# Patient Record
Sex: FEMALE | Race: WHITE | URBAN - NONMETROPOLITAN AREA
[De-identification: names, ages, dates, MRNs, and addresses within clinical notes are randomized per-mention and may not be internally consistent; named-entity substitution may affect disease eponyms.]

---

## 2021-10-11 ENCOUNTER — OFFICE VISIT (OUTPATIENT)
Dept: URBAN - NONMETROPOLITAN AREA CLINIC 4 | Facility: CLINIC | Age: 56
End: 2021-10-11
Payer: COMMERCIAL

## 2021-10-11 ENCOUNTER — LAB OUTSIDE AN ENCOUNTER (OUTPATIENT)
Dept: URBAN - NONMETROPOLITAN AREA CLINIC 4 | Facility: CLINIC | Age: 56
End: 2021-10-11

## 2021-10-11 ENCOUNTER — WEB ENCOUNTER (OUTPATIENT)
Dept: URBAN - NONMETROPOLITAN AREA CLINIC 4 | Facility: CLINIC | Age: 56
End: 2021-10-11

## 2021-10-11 VITALS
WEIGHT: 293 LBS | TEMPERATURE: 97.3 F | HEART RATE: 49 BPM | SYSTOLIC BLOOD PRESSURE: 135 MMHG | BODY MASS INDEX: 50.02 KG/M2 | DIASTOLIC BLOOD PRESSURE: 77 MMHG | HEIGHT: 64 IN

## 2021-10-11 DIAGNOSIS — E66.01 CLASS 3 SEVERE OBESITY DUE TO EXCESS CALORIES WITHOUT SERIOUS COMORBIDITY IN ADULT, UNSPECIFIED BMI: ICD-10-CM

## 2021-10-11 DIAGNOSIS — R74.8 ELEVATED LIVER ENZYMES: ICD-10-CM

## 2021-10-11 PROCEDURE — 99244 OFF/OP CNSLTJ NEW/EST MOD 40: CPT | Performed by: INTERNAL MEDICINE

## 2021-10-11 PROCEDURE — G8417 CALC BMI ABV UP PARAM F/U: HCPCS | Performed by: INTERNAL MEDICINE

## 2021-10-11 PROCEDURE — G8427 DOCREV CUR MEDS BY ELIG CLIN: HCPCS | Performed by: INTERNAL MEDICINE

## 2021-10-11 PROCEDURE — G9903 PT SCRN TBCO ID AS NON USER: HCPCS | Performed by: INTERNAL MEDICINE

## 2021-10-11 RX ORDER — URSODIOL 500 MG/1
1 TABLET TABLET ORAL TWICE A DAY
Qty: 180 TABLET | Refills: 1 | OUTPATIENT
Start: 2021-10-11

## 2021-10-11 RX ORDER — ASPIRIN 81 MG
1 TABLET TABLET, DELAYED RELEASE (ENTERIC COATED) ORAL ONCE A DAY
Status: ACTIVE | COMMUNITY

## 2021-10-11 NOTE — HPI-TODAY'S VISIT:
10/11/2021 Patient is a 55 year old White female who presents on referral from Vish Stevenson DNP, NP-C for evaluation of hepatomegaly and elevated LFTs. A copy of the note will be sent to the referring provider. Labs on 7/24/2021 showed elevated liver enzymes with AST 67, ALT 66. Viral hepatitis panel was negative. RUQ US on 8/13/2021 showed fatty liver.   Patient c/o intermittent chest pain and abdominal pain. No known family history of liver cirrhosis. Patient is not diabetic. She consumes alcohol about once a month. Patient does not take any cholesterol medication, but her levels are normal on recent labs according to her.

## 2021-10-14 ENCOUNTER — TELEPHONE ENCOUNTER (OUTPATIENT)
Dept: URBAN - NONMETROPOLITAN AREA CLINIC 4 | Facility: CLINIC | Age: 56
End: 2021-10-14

## 2021-10-24 LAB
AFP, SERUM, TUMOR MARKER: 2.8
ANTI-SMOOTH MUSCLE AB BY IFA: (no result)
ANTINUCLEAR ANTIBODIES, IFA: NEGATIVE
CERULOPLASMIN: 24.8
IRON BIND.CAP.(TIBC): 361
IRON SATURATION: 23
IRON: 83
Lab: (no result)
MITOCHONDRIAL (M2) ANTIBODY: <20
T-TRANSGLUTAMINASE (TTG) IGA: <2
T-TRANSGLUTAMINASE (TTG) IGG: <2
UIBC: 278

## 2021-12-28 ENCOUNTER — TELEPHONE ENCOUNTER (OUTPATIENT)
Dept: URBAN - NONMETROPOLITAN AREA CLINIC 4 | Facility: CLINIC | Age: 56
End: 2021-12-28

## 2022-01-10 ENCOUNTER — LAB OUTSIDE AN ENCOUNTER (OUTPATIENT)
Dept: URBAN - NONMETROPOLITAN AREA CLINIC 4 | Facility: CLINIC | Age: 57
End: 2022-01-10

## 2022-01-10 ENCOUNTER — WEB ENCOUNTER (OUTPATIENT)
Dept: URBAN - NONMETROPOLITAN AREA CLINIC 4 | Facility: CLINIC | Age: 57
End: 2022-01-10

## 2022-01-10 ENCOUNTER — OFFICE VISIT (OUTPATIENT)
Dept: URBAN - NONMETROPOLITAN AREA CLINIC 4 | Facility: CLINIC | Age: 57
End: 2022-01-10
Payer: COMMERCIAL

## 2022-01-10 DIAGNOSIS — E66.01 CLASS 3 SEVERE OBESITY DUE TO EXCESS CALORIES WITHOUT SERIOUS COMORBIDITY IN ADULT, UNSPECIFIED BMI: ICD-10-CM

## 2022-01-10 DIAGNOSIS — R74.8 ELEVATED LIVER ENZYMES: ICD-10-CM

## 2022-01-10 DIAGNOSIS — K74.60 CIRRHOSIS OF LIVER: ICD-10-CM

## 2022-01-10 DIAGNOSIS — K75.81 NASH (NONALCOHOLIC STEATOHEPATITIS): ICD-10-CM

## 2022-01-10 PROCEDURE — G9903 PT SCRN TBCO ID AS NON USER: HCPCS | Performed by: INTERNAL MEDICINE

## 2022-01-10 PROCEDURE — G8417 CALC BMI ABV UP PARAM F/U: HCPCS | Performed by: INTERNAL MEDICINE

## 2022-01-10 PROCEDURE — G8427 DOCREV CUR MEDS BY ELIG CLIN: HCPCS | Performed by: INTERNAL MEDICINE

## 2022-01-10 PROCEDURE — 99214 OFFICE O/P EST MOD 30 MIN: CPT | Performed by: INTERNAL MEDICINE

## 2022-01-10 RX ORDER — ASPIRIN 81 MG
1 TABLET TABLET, DELAYED RELEASE (ENTERIC COATED) ORAL ONCE A DAY
Status: ACTIVE | COMMUNITY

## 2022-01-10 RX ORDER — URSODIOL 500 MG/1
1 TABLET TABLET ORAL TWICE A DAY
Qty: 180 TABLET | Refills: 1 | Status: ACTIVE | COMMUNITY
Start: 2021-10-11

## 2022-01-10 NOTE — HPI-TODAY'S VISIT:
10/11/2021 Patient is a 55 year old White female who presents on referral from Vish Stevenson DNP, NP-C for evaluation of hepatomegaly and elevated LFTs. A copy of the note will be sent to the referring provider. Labs on 7/24/2021 showed elevated liver enzymes with AST 67, ALT 66. Viral hepatitis panel was negative. RUQ US on 8/13/2021 showed fatty liver. Patient c/o intermittent chest pain and abdominal pain. No known family history of liver cirrhosis. Patient is not diabetic. She consumes alcohol about once a month. Patient does not take any cholesterol medication, but her levels are normal on recent labs according to her.   01/10/2022 Patient presents for follow up office visit. Labs from 10/11/2021 were normal. Fibroscan on 11/30/2021 was suggestive of cirrhosis. Patient has been taking Ursodiol twice a day. She did have difficulty affording it, but she was able to receive it from [facility] in [place]. She also started taking turmeric and Vitamin E. She has lost 15lbs since last visit.

## 2022-01-13 ENCOUNTER — TELEPHONE ENCOUNTER (OUTPATIENT)
Dept: URBAN - NONMETROPOLITAN AREA CLINIC 4 | Facility: CLINIC | Age: 57
End: 2022-01-13

## 2022-01-19 ENCOUNTER — TELEPHONE ENCOUNTER (OUTPATIENT)
Dept: URBAN - NONMETROPOLITAN AREA CLINIC 4 | Facility: CLINIC | Age: 57
End: 2022-01-19

## 2022-01-19 ENCOUNTER — TELEPHONE ENCOUNTER (OUTPATIENT)
Dept: URBAN - METROPOLITAN AREA CLINIC 54 | Facility: CLINIC | Age: 57
End: 2022-01-19

## 2022-02-15 ENCOUNTER — TELEPHONE ENCOUNTER (OUTPATIENT)
Dept: URBAN - METROPOLITAN AREA CLINIC 54 | Facility: CLINIC | Age: 57
End: 2022-02-15

## 2022-05-10 ENCOUNTER — TELEPHONE ENCOUNTER (OUTPATIENT)
Dept: URBAN - NONMETROPOLITAN AREA CLINIC 4 | Facility: CLINIC | Age: 57
End: 2022-05-10

## 2022-05-17 ENCOUNTER — TELEPHONE ENCOUNTER (OUTPATIENT)
Dept: URBAN - NONMETROPOLITAN AREA CLINIC 4 | Facility: CLINIC | Age: 57
End: 2022-05-17

## 2022-05-17 RX ORDER — URSODIOL 500 MG/1
1 TABLET TABLET ORAL TWICE A DAY
Qty: 180 TABLET | Refills: 1
Start: 2021-10-11

## 2022-07-18 ENCOUNTER — LAB OUTSIDE AN ENCOUNTER (OUTPATIENT)
Dept: URBAN - NONMETROPOLITAN AREA CLINIC 4 | Facility: CLINIC | Age: 57
End: 2022-07-18

## 2022-07-18 ENCOUNTER — OFFICE VISIT (OUTPATIENT)
Dept: URBAN - NONMETROPOLITAN AREA CLINIC 4 | Facility: CLINIC | Age: 57
End: 2022-07-18
Payer: COMMERCIAL

## 2022-07-18 ENCOUNTER — WEB ENCOUNTER (OUTPATIENT)
Dept: URBAN - NONMETROPOLITAN AREA CLINIC 4 | Facility: CLINIC | Age: 57
End: 2022-07-18

## 2022-07-18 VITALS
BODY MASS INDEX: 49.34 KG/M2 | DIASTOLIC BLOOD PRESSURE: 69 MMHG | HEART RATE: 54 BPM | HEIGHT: 64 IN | WEIGHT: 289 LBS | SYSTOLIC BLOOD PRESSURE: 115 MMHG | TEMPERATURE: 97.5 F

## 2022-07-18 DIAGNOSIS — E66.01 CLASS 3 SEVERE OBESITY DUE TO EXCESS CALORIES WITHOUT SERIOUS COMORBIDITY IN ADULT, UNSPECIFIED BMI: ICD-10-CM

## 2022-07-18 DIAGNOSIS — K74.60 CIRRHOSIS OF LIVER: ICD-10-CM

## 2022-07-18 DIAGNOSIS — R74.8 ELEVATED LIVER ENZYMES: ICD-10-CM

## 2022-07-18 DIAGNOSIS — K75.81 NASH (NONALCOHOLIC STEATOHEPATITIS): ICD-10-CM

## 2022-07-18 PROBLEM — 19943007 CIRRHOSIS OF LIVER: Status: ACTIVE | Noted: 2022-01-10

## 2022-07-18 PROBLEM — 442685003: Status: ACTIVE | Noted: 2022-01-10

## 2022-07-18 PROBLEM — 83911000119104: Status: ACTIVE | Noted: 2021-10-11

## 2022-07-18 PROBLEM — 707724006 ELEVATED LIVER ENZYMES LEVEL: Status: ACTIVE | Noted: 2021-10-11

## 2022-07-18 PROCEDURE — 99214 OFFICE O/P EST MOD 30 MIN: CPT | Performed by: INTERNAL MEDICINE

## 2022-07-18 PROCEDURE — G8417 CALC BMI ABV UP PARAM F/U: HCPCS | Performed by: INTERNAL MEDICINE

## 2022-07-18 PROCEDURE — G9903 PT SCRN TBCO ID AS NON USER: HCPCS | Performed by: INTERNAL MEDICINE

## 2022-07-18 PROCEDURE — G8427 DOCREV CUR MEDS BY ELIG CLIN: HCPCS | Performed by: INTERNAL MEDICINE

## 2022-07-18 RX ORDER — ASPIRIN 81 MG
1 TABLET TABLET, DELAYED RELEASE (ENTERIC COATED) ORAL ONCE A DAY
Status: ACTIVE | COMMUNITY

## 2022-07-18 RX ORDER — URSODIOL 500 MG/1
1 TABLET TABLET ORAL TWICE A DAY
Qty: 180 TABLET | Refills: 1 | OUTPATIENT
Start: 2022-07-18

## 2022-07-18 RX ORDER — URSODIOL 500 MG/1
1 TABLET TABLET ORAL TWICE A DAY
Qty: 180 TABLET | Refills: 1 | Status: ACTIVE | COMMUNITY
Start: 2021-10-11

## 2022-07-18 NOTE — HPI-TODAY'S VISIT:
10/11/2021 Patient is a 55 year old White female who presents on referral from Vish Stevenson DNP, NP-C for evaluation of hepatomegaly and elevated LFTs. A copy of the note will be sent to the referring provider. Labs on 7/24/2021 showed elevated liver enzymes with AST 67, ALT 66. Viral hepatitis panel was negative. RUQ US on 8/13/2021 showed fatty liver. Patient c/o intermittent chest pain and abdominal pain. No known family history of liver cirrhosis. Patient is not diabetic. She consumes alcohol about once a month. Patient does not take any cholesterol medication, but her levels are normal on recent labs according to her.   01/10/2022 Patient presents for follow up office visit. Labs from 10/11/2021 were normal. Fibroscan on 11/30/2021 was suggestive of cirrhosis. Patient has been taking Ursodiol twice a day. She did have difficulty affording it, but she was able to receive it from [facility] in [place]. She also started taking turmeric and Vitamin E. She has lost 15lbs since last visit.  07/18/022 Patient presents for a follow up on tests results. Liver biopsy done on patient on 2/7/2022 shows ROPER. Patient states that she was put on spironolactone. Patient also states that she is currently taking ursodiol. She states that she does not take cholesterol medication and that she has not taken any in the past. She denies drinking alcohol. She states she has some heartburn and acid reflux, and that she takes pantoprazole.

## 2022-08-01 LAB
A/G RATIO: 1.4
ABSOLUTE BASOPHILS: 38
ABSOLUTE EOSINOPHILS: 162
ABSOLUTE LYMPHOCYTES: 2716
ABSOLUTE MONOCYTES: 400
ABSOLUTE NEUTROPHILS: 2084
AFP, SERUM, TUMOR MARKER: 2.7
ALBUMIN: 4.3
ALKALINE PHOSPHATASE: 65
ALT (SGPT): 22
AST (SGOT): 21
BASOPHILS: 0.7
BILIRUBIN, TOTAL: 0.5
BUN/CREATININE RATIO: (no result)
BUN: 10
CALCIUM: 9.6
CARBON DIOXIDE, TOTAL: 25
CHLORIDE: 105
CHOL/HDLC RATIO: 4.7
CHOLESTEROL, TOTAL: 217
CREATININE: 0.55
EGFR: 108
EOSINOPHILS: 3
GLOBULIN, TOTAL: 3
GLUCOSE: 103
HDL CHOLESTEROL: 46
HEMATOCRIT: 38.8
HEMOGLOBIN: 13.5
LDL CHOLESTEROL CALC: 140
LYMPHOCYTES: 50.3
MCH: 30.9
MCHC: 34.8
MCV: 88.8
MONOCYTES: 7.4
MPV: 10.7
NEUTROPHILS: 38.6
NON HDL CHOLESTEROL: 171
PLATELET COUNT: 323
POTASSIUM: 4.3
PROTEIN, TOTAL: 7.3
RDW: 13.1
RED BLOOD CELL COUNT: 4.37
SODIUM: 141
TRIGLYCERIDES: 171
WHITE BLOOD CELL COUNT: 5.4

## 2022-11-11 ENCOUNTER — TELEPHONE ENCOUNTER (OUTPATIENT)
Dept: URBAN - NONMETROPOLITAN AREA CLINIC 4 | Facility: CLINIC | Age: 57
End: 2022-11-11

## 2022-11-11 RX ORDER — URSODIOL 500 MG/1
1 TABLET TABLET ORAL TWICE A DAY
Qty: 180 TABLET | Refills: 0
Start: 2022-07-18

## 2023-01-04 ENCOUNTER — TELEPHONE ENCOUNTER (OUTPATIENT)
Dept: URBAN - NONMETROPOLITAN AREA CLINIC 4 | Facility: CLINIC | Age: 58
End: 2023-01-04

## 2023-01-04 ENCOUNTER — TELEPHONE ENCOUNTER (OUTPATIENT)
Dept: URBAN - METROPOLITAN AREA CLINIC 54 | Facility: CLINIC | Age: 58
End: 2023-01-04

## 2023-01-04 RX ORDER — URSODIOL 500 MG/1
1 TABLET TABLET ORAL TWICE A DAY
Qty: 180 TABLET | Refills: 0
Start: 2022-07-18

## 2023-01-16 ENCOUNTER — OFFICE VISIT (OUTPATIENT)
Dept: URBAN - NONMETROPOLITAN AREA CLINIC 4 | Facility: CLINIC | Age: 58
End: 2023-01-16

## 2023-02-06 ENCOUNTER — DASHBOARD ENCOUNTERS (OUTPATIENT)
Age: 58
End: 2023-02-06

## 2023-02-10 ENCOUNTER — OFFICE VISIT (OUTPATIENT)
Dept: URBAN - METROPOLITAN AREA CLINIC 82 | Facility: CLINIC | Age: 58
End: 2023-02-10